# Patient Record
Sex: MALE | Race: WHITE | URBAN - NONMETROPOLITAN AREA
[De-identification: names, ages, dates, MRNs, and addresses within clinical notes are randomized per-mention and may not be internally consistent; named-entity substitution may affect disease eponyms.]

---

## 2022-12-15 NOTE — PROGRESS NOTES
[facility]    Patient: Lori Banks, 58 y.o.   : 1960  2022      Pt is new and a self-referral to [facility]     Subjective     Chief Complaint   Patient presents with    New Patient     New sleep consult. Just moved back to the area and wishes to establish with a provider and DME. Is requesting a script for a new machine. His current machine is over 11ears old. HPI  Renetta Montemayor is here for YULY. He was diagnosed with YULY about 10 years ago at [facility]. Sleep study not available currently. Prior to diagnosis and treatment, he had snoring, EDS, irritable. He gets good benefit from PAP.         Sleep Hx     Sleep symptoms:  Yes No  Additional Comments   Snoring [] [x]    Witness Apneas [] [x]        Waking snorting/gasping [] [x]     Nocturia []    [x]     Legs kicking at night [] [x]     AM Headaches [] [x]     Non-restorative sleep [] [x]     Daytime sleepiness/fatigue [] [x]     Daytime napping [] [x]     Drowsiness while driving [] [x]     Memory issues [] [x]     Decreased concentration [] [x]     Cataplexy [] [x]     Hypnogogic hallucinations [] [x]     Sleep paralysis [] [x]     Other [] [x]        Symptoms began 11 year(s)      Previous evaluation and treatment has included-PSG with C PAP titration     Previous Report(s) Reviewed: historical medical records, office notes and referral letter/letters        Download     PAP Download:   Original or initial  AHI: NA     Date of initial study: over 10 yrs ago      [x] Compliant  100% []Noncompliant 0 %     PAP Type CPAP     Level  [id number]   Avg Hrs/Day 7hrs 37mins   Recorded compliance dates , 22  to 12/15/22   Total Hours: 86124  Machine/Mfg: Resmed Interface: full face    Provider:  []CHAVA  []Amador  []Clifford  []Damaris         [][facility] [x]Other: none currently - wants to get established  Neck Size: 18.75  Mallampati 2  ESS:  5  SAQLI:  89      Past Medical hx     PMH:  Past Medical History:   Diagnosis Date    Atrial fibrillation, unspecified type ([facility] [address]     HTN (hypertension)      SURGICAL HISTORY:  Past Surgical History:   Procedure Laterality Date    REVISION TOTAL HIP ARTHROPLASTY      TOTAL HIP ARTHROPLASTY      TOTAL KNEE ARTHROPLASTY       SOCIAL HISTORY:  Social History     Tobacco Use    Smoking status: Never    Smokeless tobacco: Never    Tobacco comments:     Never smoker   Vaping Use    Vaping Use: Never used   Substance Use Topics    Alcohol use: Yes     Alcohol/week: 2.0 standard drinks     Types: 2 Shots of liquor per week     Comment: social on weekends    Drug use: Never     ALLERGIES:No Known Allergies  FAMILY HISTORY:History reviewed. No pertinent family history. CURRENT MEDICATIONS:  Current Outpatient Medications   Medication Sig Dispense Refill    dilTIAZem (TIAZAC) 300 MG extended release capsule Take 300 mg by mouth daily      cloNIDine (CATAPRES) 0.1 MG tablet Take 0.1 mg by mouth 2 times daily      rosuvastatin (CRESTOR) 10 MG tablet Take 10 mg by mouth daily      aspirin 81 MG chewable tablet Take 81 mg by mouth daily      lisinopril-hydroCHLOROthiazide (PRINZIDE;ZESTORETIC) 20-12.5 MG per tablet TAKE 2 TABLETS BY MOUTH ONCE A DAY FOR 7 DAYS       No current facility-administered medications for this visit. [facility] Hidden ROS   Review of Systems   Constitutional:  Negative for activity change, appetite change, chills and fever. HENT:  Negative for congestion and postnasal drip. Eyes: Negative. Respiratory:  Negative for cough, chest tightness, shortness of breath, wheezing and stridor. Cardiovascular:  Negative for chest pain and leg swelling. Gastrointestinal:  Negative for diarrhea and nausea. Endocrine: Negative. Genitourinary: Negative. Musculoskeletal: Negative. Negative for arthralgias and back pain. Skin: Negative. Allergic/Immunologic: Negative. Neurological: Negative. Negative for dizziness and light-headedness. Psychiatric/Behavioral: Negative. All other systems reviewed and are negative. Physical exam   /76 (Site: Right Upper Arm, Position: Sitting, Cuff Size: Large Adult)   Pulse 71   Temp 98.1 °F (36.7 °C) (Oral)   Ht 6' 4\" (1.93 m)   Wt (!) 305 lb 12.8 oz (138.7 kg)   SpO2 95% Comment: r/a  BMI 37.22 kg/m²      Physical Exam  Constitutional:       Appearance: Normal appearance. He is normal weight. HENT:      Head: Normocephalic and atraumatic. Right Ear: External ear normal.      Left Ear: External ear normal.      Nose: Nose normal.   Eyes:      Extraocular Movements: Extraocular movements intact. Conjunctiva/sclera: Conjunctivae normal.      Pupils: Pupils are equal, round, and reactive to light. Pulmonary:      Effort: Pulmonary effort is normal.   Musculoskeletal:      Cervical back: Normal range of motion and neck supple. Neurological:      General: No focal deficit present. Mental Status: He is alert and oriented to person, place, and time. Psychiatric:         Attention and Perception: Attention and perception normal.         Mood and Affect: Mood and affect normal.         Speech: Speech normal.         Behavior: Behavior normal. Behavior is cooperative. Thought Content: Thought content normal.         Cognition and Memory: Cognition normal.         Judgment: Judgment normal.        Sleep Study   Not available currently  Assessment      Diagnosis Orders   1. YULY on CPAP        2. Obesity (BMI 30-39. 9)             Plan   - Will order new PAP since his is getting loud  - Download reviewed and discussed with patient  - He  was advised to continue current positive airway pressure therapy with above described pressure.    - He  advised to keep good compliance with current recommended pressure to get optimal results and clinical improvement  - Recommend 7-9 hours of sleep with PAP  - He was advised to call [employer] regarding supplies if needed.   -He call my office for earlier appointment if needed for worsening of sleep symptoms.   - He was instructed on weight loss  - Frankie De La Cruz was educated about my impression and plan. Patient verbalizesunderstanding.   We will see Heidy Fontenot back in: 3 months with download    Information added by my medical assistant/LPN was reviewed today     Osiris Alberto, [address] [place] [address] for pulmonary and Sleep Medicine  12/16/2022

## 2022-12-16 ENCOUNTER — INITIAL CONSULT (OUTPATIENT)
Dept: PULMONOLOGY | Age: 62
End: 2022-12-16
Payer: COMMERCIAL

## 2022-12-16 VITALS
OXYGEN SATURATION: 95 % | DIASTOLIC BLOOD PRESSURE: 76 MMHG | TEMPERATURE: 98.1 F | HEIGHT: 76 IN | BODY MASS INDEX: 37.24 KG/M2 | WEIGHT: 305.8 LBS | SYSTOLIC BLOOD PRESSURE: 120 MMHG | HEART RATE: 71 BPM

## 2022-12-16 DIAGNOSIS — G47.33 OSA ON CPAP: Primary | ICD-10-CM

## 2022-12-16 DIAGNOSIS — Z99.89 OSA ON CPAP: Primary | ICD-10-CM

## 2022-12-16 DIAGNOSIS — E66.9 OBESITY (BMI 30-39.9): ICD-10-CM

## 2022-12-16 PROCEDURE — 99203 OFFICE O/P NEW LOW 30 MIN: CPT | Performed by: PHYSICIAN ASSISTANT

## 2022-12-16 RX ORDER — LISINOPRIL AND HYDROCHLOROTHIAZIDE 20; 12.5 MG/1; MG/1
TABLET ORAL
COMMUNITY
Start: 2022-11-21

## 2022-12-16 RX ORDER — ROSUVASTATIN CALCIUM 10 MG/1
10 TABLET, COATED ORAL DAILY
COMMUNITY
Start: 2019-03-13

## 2022-12-16 RX ORDER — DILTIAZEM HYDROCHLORIDE 300 MG/1
300 CAPSULE, EXTENDED RELEASE ORAL DAILY
COMMUNITY
Start: 2022-03-01

## 2022-12-16 RX ORDER — ASPIRIN 81 MG/1
81 TABLET, CHEWABLE ORAL DAILY
COMMUNITY

## 2022-12-16 RX ORDER — CLONIDINE HYDROCHLORIDE 0.1 MG/1
0.1 TABLET ORAL 2 TIMES DAILY
COMMUNITY
Start: 2019-03-13

## 2022-12-16 ASSESSMENT — ENCOUNTER SYMPTOMS
WHEEZING: 0
EYES NEGATIVE: 1
NAUSEA: 0
STRIDOR: 0
CHEST TIGHTNESS: 0
DIARRHEA: 0
COUGH: 0
BACK PAIN: 0
ALLERGIC/IMMUNOLOGIC NEGATIVE: 1
SHORTNESS OF BREATH: 0

## 2023-03-16 NOTE — PROGRESS NOTES
[facility]      Alex Mcdonough         [id number]  3/17/2023   Chief Complaint   Patient presents with    Follow-up     3mo YULY f/u w/ [employer] download. Doing well. Pt of Glenna Aparicio PA-C    PAP Download:   Original or initial AHI: 42    Date of initial study: 2010    Compliant  100%     Noncompliant 0 %     PAP Type CPAP    Level  16itB6M   Avg Hrs/Day 7hrs 42mins  AHI: 3.5   Recorded compliance dates , 2/14/23  to 3/15/23   Machine/Mfg:   [x] ResMed    [] Respironics/Dreamstation   Interface:   [] Nasal    [] Nasal pillows   [x] FFM      Provider:      [] -URSULA     []Amador     [] Clifford    [] Artist Juan Daniel    [] Rosita               [] [facility]      [x] Adaptive    [] [address]      [] Other    Neck Size: 18.75  Mallampati 2  ESS:  5  SAQLI: 91    Here is a scan of the most recent download:                Presentation:   Nicolasa Goel presents for sleep medicine follow up for obstructive sleep apnea  Since the last visit, Nicolasa Goel is doing well with new PAP. He is sleeping well and feels rested. Equipment issues: The pressure is  acceptable, the mask is acceptable     Sleep issues:  Do you feel better? Yes  More rested? Yes   Better concentration? yes    Progress History:   Since last visit any new medical issues? No  New ER or hospital visits? No  Any new or changes in medicines? No  Any new sleep medicines? No    Review of Systems -   Review of Systems   Constitutional:  Negative for activity change, appetite change, chills and fever. HENT:  Negative for congestion and postnasal drip. Eyes: Negative. Respiratory:  Negative for cough, chest tightness, shortness of breath, wheezing and stridor. Cardiovascular:  Negative for chest pain and leg swelling. Gastrointestinal:  Negative for diarrhea and nausea. Endocrine: Negative. Genitourinary: Negative. Musculoskeletal: Negative. Negative for arthralgias and back pain. Skin: Negative. Allergic/Immunologic: Negative. Neurological: Negative. Negative for dizziness and light-headedness. Psychiatric/Behavioral: Negative. All other systems reviewed and are negative. Physical Exam:    BMI:  Body mass index is 37.76 kg/m². Wt Readings from Last 3 Encounters:   03/17/23 (!) 310 lb 3.2 oz (140.7 kg)   12/16/22 (!) 305 lb 12.8 oz (138.7 kg)     Weight stable / unchanged  Vitals: /84 (Site: Right Upper Arm, Position: Sitting, Cuff Size: Large Adult)   Pulse 71   Temp 97.9 °F (36.6 °C) (Oral)   Ht 6' 4\" (1.93 m)   Wt (!) 310 lb 3.2 oz (140.7 kg)   SpO2 96% Comment: r/a  BMI 37.76 kg/m²       Physical Exam  Constitutional:       Appearance: Normal appearance. He is normal weight. HENT:      Head: Normocephalic and atraumatic. Right Ear: External ear normal.      Left Ear: External ear normal.      Nose: Nose normal.   Eyes:      Extraocular Movements: Extraocular movements intact. Conjunctiva/sclera: Conjunctivae normal.      Pupils: Pupils are equal, round, and reactive to light. Pulmonary:      Effort: Pulmonary effort is normal.   Musculoskeletal:      Cervical back: Normal range of motion and neck supple. Neurological:      General: No focal deficit present. Mental Status: He is alert and oriented to person, place, and time. Psychiatric:         Attention and Perception: Attention and perception normal.         Mood and Affect: Mood and affect normal.         Speech: Speech normal.         Behavior: Behavior normal. Behavior is cooperative. Thought Content: Thought content normal.         Cognition and Memory: Cognition normal.         Judgment: Judgment normal.         ASSESSMENT/DIAGNOSIS     Diagnosis Orders   1. YULY on CPAP        2. Obesity (BMI 30-39. 9)                 Plan   Do you need any equipment today?  Yes update supplies  - Download reviewed and discussed with patient  - He  was advised to continue current positive airway pressure therapy with above described pressure. - He  advised to keep good compliance with current recommended pressure to get optimal results and clinical improvement  - Recommend 7-9 hours of sleep with PAP  - He was advised to call [employer] company regarding supplies if needed.   -He call my office for earlier appointment if needed for worsening of sleep symptoms.   - He was instructed on weight loss  - Wagner Osorio was educated about my impression and plan. Patient verbalizesunderstanding.   We will see Alec Elaine back in: 1 year with download    Information added by my medical assistant/LPN was reviewed today       Nadeen Venegas, [facility] for pulmonary and Sleep Medicine  3/17/2023

## 2023-03-17 ENCOUNTER — OFFICE VISIT (OUTPATIENT)
Dept: PULMONOLOGY | Age: 63
End: 2023-03-17
Payer: COMMERCIAL

## 2023-03-17 VITALS
WEIGHT: 310.2 LBS | HEART RATE: 71 BPM | SYSTOLIC BLOOD PRESSURE: 130 MMHG | OXYGEN SATURATION: 96 % | DIASTOLIC BLOOD PRESSURE: 84 MMHG | HEIGHT: 76 IN | TEMPERATURE: 97.9 F | BODY MASS INDEX: 37.77 KG/M2

## 2023-03-17 DIAGNOSIS — E66.9 OBESITY (BMI 30-39.9): ICD-10-CM

## 2023-03-17 DIAGNOSIS — G47.33 OSA ON CPAP: Primary | ICD-10-CM

## 2023-03-17 DIAGNOSIS — Z99.89 OSA ON CPAP: Primary | ICD-10-CM

## 2023-03-17 PROCEDURE — 99213 OFFICE O/P EST LOW 20 MIN: CPT | Performed by: PHYSICIAN ASSISTANT

## 2023-03-17 ASSESSMENT — ENCOUNTER SYMPTOMS
COUGH: 0
EYES NEGATIVE: 1
ALLERGIC/IMMUNOLOGIC NEGATIVE: 1
WHEEZING: 0
BACK PAIN: 0
DIARRHEA: 0
SHORTNESS OF BREATH: 0
NAUSEA: 0
STRIDOR: 0
CHEST TIGHTNESS: 0

## 2024-04-04 NOTE — PROGRESS NOTES
[facility]      Justus Valadez         [id number]  4/5/2024   Chief Complaint   Patient presents with    Follow-up     1yr YULY f/u w/Adaptive download.  Doing well.  Needs script for PAP supplies.         Pt of Mary Kate Kwong PA-C     PAP Download:   Original or initial AHI: 42    Date of initial study: 2010    Compliant  100%     Noncompliant 0 %     PAP Type CPAP  Level  11 cmH2O   Avg Hrs/Day 8hrs 7mins  AHI: 4.0   Recorded compliance dates , 3/5/24  to 4/3/24   Machine/Mfg:   [x] ResMed    [] Respironics/Dreamstation   Interface:   [] Nasal    [] Nasal pillows   [x] FFM      Provider:      [] -URSULA     []Amador     [] Clifford    [] Damaris    [] Rosita               [] [facility]      [x] Adaptive    [] [place]:      [] Other    Neck Size: 18 inches  Mallampati 2  ESS:  5  SAQLI: 94    Here is a scan of the most recent download:                    Presentation:   Justus presents for sleep medicine follow up for obstructive sleep apnea  Since the last visit, Justus is doing well with PAP.  He  is sleeping well and feels rested.  Mask fits well.      Equipment issues:  The pressure is  acceptable, the mask is acceptable     Sleep issues:  Do you feel better? Yes  More rested?Yes   Better concentration? yes    Progress History:   Since last visit any new medical issues? No  New ER or hospital visits? No  Any new or changes in medicines? No  Any new sleep medicines? No    Review of Systems -   Review of Systems   Constitutional:  Negative for activity change, appetite change, chills and fever.   HENT:  Negative for congestion and postnasal drip.    Eyes: Negative.    Respiratory:  Negative for cough, chest tightness, shortness of breath, wheezing and stridor.    Cardiovascular:  Negative for chest pain and leg swelling.   Gastrointestinal:  Negative for diarrhea and nausea.   Endocrine: Negative.    Genitourinary: Negative.    Musculoskeletal: Negative.  Negative

## 2024-04-05 ENCOUNTER — OFFICE VISIT (OUTPATIENT)
Dept: PULMONOLOGY | Age: 64
End: 2024-04-05
Payer: COMMERCIAL

## 2024-04-05 VITALS
BODY MASS INDEX: 36.31 KG/M2 | SYSTOLIC BLOOD PRESSURE: 114 MMHG | TEMPERATURE: 97.8 F | OXYGEN SATURATION: 97 % | WEIGHT: 298.2 LBS | DIASTOLIC BLOOD PRESSURE: 78 MMHG | HEIGHT: 76 IN | HEART RATE: 60 BPM

## 2024-04-05 DIAGNOSIS — E66.9 OBESITY (BMI 30-39.9): ICD-10-CM

## 2024-04-05 DIAGNOSIS — G47.33 OSA ON CPAP: Primary | ICD-10-CM

## 2024-04-05 PROCEDURE — 99213 OFFICE O/P EST LOW 20 MIN: CPT | Performed by: PHYSICIAN ASSISTANT

## 2024-04-05 RX ORDER — NEBIVOLOL 10 MG/1
2.5 TABLET ORAL DAILY
COMMUNITY
End: 2024-04-05

## 2024-04-05 RX ORDER — NEBIVOLOL 2.5 MG/1
2.5 TABLET ORAL DAILY
COMMUNITY

## 2024-04-05 ASSESSMENT — ENCOUNTER SYMPTOMS
SHORTNESS OF BREATH: 0
COUGH: 0
EYES NEGATIVE: 1
ALLERGIC/IMMUNOLOGIC NEGATIVE: 1
WHEEZING: 0
BACK PAIN: 0
CHEST TIGHTNESS: 0
STRIDOR: 0
DIARRHEA: 0
NAUSEA: 0

## 2025-04-03 NOTE — PROGRESS NOTES
[facility]      Justus Valadez         [id number]  4/4/2025   Chief Complaint   Patient presents with    Follow-up     1yr YULY f/u w/[employer]ptive download.  Doing well.  Needs script for PAP supplies.         Pt of Mary Kate Kwong PA-C     PAP Download:   Original or initial AHI: 42    Date of initial study: 2010    Compliant  100%     Noncompliant 0 %     PAP Type CPAP  Level  11 cmH2O   Avg Hrs/Day 7hrs 28mins  AHI: 4.0   Leaks : 95 th percentile: 0.0   Recorded compliance dates , 3/2/25  to 3/31/25   Machine/Mfg:   [x] ResMed    [] Respironics/Dreamstation   Interface:   [] Nasal    [] Nasal pillows   [x] FFM      Provider:      [] -HME     []Amador     [] Clifford    [] Damaris    [] Rosita               [] [facility]      [x] Adaptive    [] [place]:      [] Other    Neck Size: 18 inches  Mallampati 2  ESS:  2  SAQLI: 91    Here is a scan of the most recent download:                      Presentation:   Justus presents for 1 yearsle medicine follow up for obstructive sleep apnea  Since the last visit, Justus is using is CPAP With good compliance and continued benefit :\" cannot sleep without it\" occasional awakenings at 3 AM , can take up to 1 hour to get back to sleep, does not happen all the time. \"Goes in spurts\"       Progress History:   Since last visit any new medical issues? Yes; new diagnosis DMII - now on metformin  Any trouble with Machine No  Any new sleep medicines? no      Equipment issues:  The pressure is  acceptable, the mask is acceptable     Review of Systems -   Review of Systems     Physical Exam:    BMI:  Body mass index is 36.05 kg/m².    Wt Readings from Last 3 Encounters:   04/04/25 134.4 kg (296 lb 3.2 oz)   04/05/24 135.3 kg (298 lb 3.2 oz)   03/17/23 (!) 140.7 kg (310 lb 3.2 oz)     Weight stable / unchanged  Vitals: /62 (BP Site: Left Upper Arm, Patient Position: Sitting, BP Cuff Size: Large Adult)   Pulse 67   Temp 98.1 °F (36.7

## 2025-04-04 ENCOUNTER — OFFICE VISIT (OUTPATIENT)
Dept: PULMONOLOGY | Age: 65
End: 2025-04-04
Payer: COMMERCIAL

## 2025-04-04 VITALS
HEART RATE: 67 BPM | SYSTOLIC BLOOD PRESSURE: 108 MMHG | OXYGEN SATURATION: 96 % | WEIGHT: 296.2 LBS | DIASTOLIC BLOOD PRESSURE: 62 MMHG | HEIGHT: 76 IN | TEMPERATURE: 98.1 F | BODY MASS INDEX: 36.07 KG/M2

## 2025-04-04 DIAGNOSIS — E66.9 OBESITY (BMI 30-39.9): ICD-10-CM

## 2025-04-04 DIAGNOSIS — G47.33 OSA ON CPAP: Primary | ICD-10-CM

## 2025-04-04 PROBLEM — I48.0 PAROXYSMAL ATRIAL FIBRILLATION (HCC): Status: ACTIVE | Noted: 2017-02-10

## 2025-04-04 PROCEDURE — 3078F DIAST BP <80 MM HG: CPT | Performed by: NURSE PRACTITIONER

## 2025-04-04 PROCEDURE — 3074F SYST BP LT 130 MM HG: CPT | Performed by: NURSE PRACTITIONER

## 2025-04-04 PROCEDURE — 99214 OFFICE O/P EST MOD 30 MIN: CPT | Performed by: NURSE PRACTITIONER

## 2025-04-04 RX ORDER — MELOXICAM 15 MG/1
15 TABLET ORAL DAILY PRN
COMMUNITY
Start: 2025-03-06

## 2025-04-04 RX ORDER — LISINOPRIL 10 MG/1
10 TABLET ORAL DAILY
COMMUNITY